# Patient Record
Sex: MALE | Race: WHITE | NOT HISPANIC OR LATINO | Employment: PART TIME | URBAN - METROPOLITAN AREA
[De-identification: names, ages, dates, MRNs, and addresses within clinical notes are randomized per-mention and may not be internally consistent; named-entity substitution may affect disease eponyms.]

---
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
## 2020-02-03 RX ORDER — LEVETIRACETAM 500 MG/1
TABLET ORAL
Qty: 60 TABLET | Refills: 1 | Status: SHIPPED | OUTPATIENT
Start: 2020-02-03 | End: 2023-08-24

## 2022-04-11 ENCOUNTER — HISTORICAL (OUTPATIENT)
Dept: ADMINISTRATIVE | Facility: HOSPITAL | Age: 22
End: 2022-04-11

## 2022-04-29 VITALS
HEIGHT: 67 IN | WEIGHT: 171.5 LBS | SYSTOLIC BLOOD PRESSURE: 120 MMHG | BODY MASS INDEX: 26.92 KG/M2 | DIASTOLIC BLOOD PRESSURE: 78 MMHG

## 2022-08-15 ENCOUNTER — HOSPITAL ENCOUNTER (EMERGENCY)
Facility: HOSPITAL | Age: 22
Discharge: PSYCHIATRIC OR SUBSTANCE USE FACILITY | End: 2022-08-16
Attending: EMERGENCY MEDICINE
Payer: COMMERCIAL

## 2022-08-15 DIAGNOSIS — F95.2 TOURETTE DISEASE: ICD-10-CM

## 2022-08-15 DIAGNOSIS — R25.9 ABNORMAL INVOLUNTARY MOVEMENTS: ICD-10-CM

## 2022-08-15 DIAGNOSIS — F42.9 OBSESSIVE-COMPULSIVE DISORDER, UNSPECIFIED TYPE: Primary | ICD-10-CM

## 2022-08-15 DIAGNOSIS — R45.1 RESTLESSNESS AND AGITATION: ICD-10-CM

## 2022-08-15 PROCEDURE — 99285 EMERGENCY DEPT VISIT HI MDM: CPT | Mod: 25

## 2022-08-16 VITALS
OXYGEN SATURATION: 99 % | RESPIRATION RATE: 20 BRPM | BODY MASS INDEX: 34.21 KG/M2 | WEIGHT: 218 LBS | DIASTOLIC BLOOD PRESSURE: 84 MMHG | SYSTOLIC BLOOD PRESSURE: 126 MMHG | HEIGHT: 67 IN | HEART RATE: 71 BPM | TEMPERATURE: 98 F

## 2022-08-16 LAB
ALBUMIN SERPL-MCNC: 4.7 GM/DL (ref 3.5–5)
ALBUMIN/GLOB SERPL: 1.7 RATIO (ref 1.1–2)
ALP SERPL-CCNC: 81 UNIT/L (ref 40–150)
ALT SERPL-CCNC: 27 UNIT/L (ref 0–55)
AMPHET UR QL SCN: NEGATIVE
APAP SERPL-MCNC: <17.4 UG/ML (ref 17.4–30)
APPEARANCE UR: CLEAR
AST SERPL-CCNC: 20 UNIT/L (ref 5–34)
BACTERIA #/AREA URNS AUTO: NORMAL /HPF
BARBITURATE SCN PRESENT UR: NEGATIVE
BASOPHILS # BLD AUTO: 0.07 X10(3)/MCL (ref 0–0.2)
BASOPHILS NFR BLD AUTO: 0.6 %
BENZODIAZ UR QL SCN: NEGATIVE
BILIRUB UR QL STRIP.AUTO: NEGATIVE MG/DL
BILIRUBIN DIRECT+TOT PNL SERPL-MCNC: 0.6 MG/DL
BUN SERPL-MCNC: 6.4 MG/DL (ref 8.9–20.6)
CALCIUM SERPL-MCNC: 10.4 MG/DL (ref 8.4–10.2)
CANNABINOIDS UR QL SCN: NEGATIVE
CHLORIDE SERPL-SCNC: 103 MMOL/L (ref 98–107)
CO2 SERPL-SCNC: 27 MMOL/L (ref 22–29)
COCAINE UR QL SCN: NEGATIVE
COLOR UR AUTO: YELLOW
CREAT SERPL-MCNC: 1.17 MG/DL (ref 0.73–1.18)
EOSINOPHIL # BLD AUTO: 0.18 X10(3)/MCL (ref 0–0.9)
EOSINOPHIL NFR BLD AUTO: 1.5 %
ERYTHROCYTE [DISTWIDTH] IN BLOOD BY AUTOMATED COUNT: 12 % (ref 11.5–17)
ETHANOL SERPL-MCNC: <10 MG/DL
FENTANYL UR QL SCN: NEGATIVE
GFR SERPLBLD CREATININE-BSD FMLA CKD-EPI: >60 MLS/MIN/1.73/M2
GLOBULIN SER-MCNC: 2.8 GM/DL (ref 2.4–3.5)
GLUCOSE SERPL-MCNC: 121 MG/DL (ref 74–100)
GLUCOSE UR QL STRIP.AUTO: NEGATIVE MG/DL
HCT VFR BLD AUTO: 47.8 % (ref 42–52)
HGB BLD-MCNC: 16.5 GM/DL (ref 14–18)
IMM GRANULOCYTES # BLD AUTO: 0.07 X10(3)/MCL (ref 0–0.04)
IMM GRANULOCYTES NFR BLD AUTO: 0.6 %
KETONES UR QL STRIP.AUTO: NEGATIVE MG/DL
LEUKOCYTE ESTERASE UR QL STRIP.AUTO: NEGATIVE UNIT/L
LYMPHOCYTES # BLD AUTO: 2.07 X10(3)/MCL (ref 0.6–4.6)
LYMPHOCYTES NFR BLD AUTO: 17.1 %
MCH RBC QN AUTO: 30 PG (ref 27–31)
MCHC RBC AUTO-ENTMCNC: 34.5 MG/DL (ref 33–36)
MCV RBC AUTO: 86.9 FL (ref 80–94)
MDMA UR QL SCN: NEGATIVE
MONOCYTES # BLD AUTO: 0.6 X10(3)/MCL (ref 0.1–1.3)
MONOCYTES NFR BLD AUTO: 4.9 %
NEUTROPHILS # BLD AUTO: 9.1 X10(3)/MCL (ref 2.1–9.2)
NEUTROPHILS NFR BLD AUTO: 75.3 %
NITRITE UR QL STRIP.AUTO: NEGATIVE
NRBC BLD AUTO-RTO: 0 %
OPIATES UR QL SCN: NEGATIVE
PCP UR QL: NEGATIVE
PH UR STRIP.AUTO: 6.5 [PH]
PH UR: 6.5 [PH] (ref 3–11)
PLATELET # BLD AUTO: 329 X10(3)/MCL (ref 130–400)
PMV BLD AUTO: 10.1 FL (ref 7.4–10.4)
POTASSIUM SERPL-SCNC: 4 MMOL/L (ref 3.5–5.1)
PROT SERPL-MCNC: 7.5 GM/DL (ref 6.4–8.3)
PROT UR QL STRIP.AUTO: NEGATIVE MG/DL
RBC # BLD AUTO: 5.5 X10(6)/MCL (ref 4.7–6.1)
RBC #/AREA URNS AUTO: <5 /HPF
RBC UR QL AUTO: NEGATIVE UNIT/L
SARS-COV-2 RDRP RESP QL NAA+PROBE: NEGATIVE
SODIUM SERPL-SCNC: 139 MMOL/L (ref 136–145)
SP GR UR STRIP.AUTO: 1 (ref 1–1.03)
SPECIFIC GRAVITY, URINE AUTO (.000) (OHS): 1 (ref 1–1.03)
SQUAMOUS #/AREA URNS AUTO: <5 /HPF
TSH SERPL-ACNC: 3.03 UIU/ML (ref 0.35–4.94)
UROBILINOGEN UR STRIP-ACNC: 0.2 MG/DL
WBC # SPEC AUTO: 12.1 X10(3)/MCL (ref 4.5–11.5)
WBC #/AREA URNS AUTO: <5 /HPF

## 2022-08-16 PROCEDURE — 96361 HYDRATE IV INFUSION ADD-ON: CPT

## 2022-08-16 PROCEDURE — 25000003 PHARM REV CODE 250: Performed by: EMERGENCY MEDICINE

## 2022-08-16 PROCEDURE — 84443 ASSAY THYROID STIM HORMONE: CPT | Performed by: EMERGENCY MEDICINE

## 2022-08-16 PROCEDURE — 85025 COMPLETE CBC W/AUTO DIFF WBC: CPT | Performed by: EMERGENCY MEDICINE

## 2022-08-16 PROCEDURE — 80053 COMPREHEN METABOLIC PANEL: CPT | Performed by: EMERGENCY MEDICINE

## 2022-08-16 PROCEDURE — 80143 DRUG ASSAY ACETAMINOPHEN: CPT | Performed by: EMERGENCY MEDICINE

## 2022-08-16 PROCEDURE — 82077 ASSAY SPEC XCP UR&BREATH IA: CPT | Performed by: EMERGENCY MEDICINE

## 2022-08-16 PROCEDURE — 63600175 PHARM REV CODE 636 W HCPCS: Performed by: EMERGENCY MEDICINE

## 2022-08-16 PROCEDURE — 80307 DRUG TEST PRSMV CHEM ANLYZR: CPT | Performed by: EMERGENCY MEDICINE

## 2022-08-16 PROCEDURE — 96372 THER/PROPH/DIAG INJ SC/IM: CPT | Performed by: EMERGENCY MEDICINE

## 2022-08-16 PROCEDURE — 96374 THER/PROPH/DIAG INJ IV PUSH: CPT

## 2022-08-16 PROCEDURE — 81001 URINALYSIS AUTO W/SCOPE: CPT | Performed by: EMERGENCY MEDICINE

## 2022-08-16 PROCEDURE — 87635 SARS-COV-2 COVID-19 AMP PRB: CPT | Performed by: EMERGENCY MEDICINE

## 2022-08-16 PROCEDURE — 36415 COLL VENOUS BLD VENIPUNCTURE: CPT | Performed by: EMERGENCY MEDICINE

## 2022-08-16 RX ORDER — FLUOXETINE HYDROCHLORIDE 40 MG/1
40 CAPSULE ORAL DAILY
COMMUNITY
End: 2023-08-24

## 2022-08-16 RX ORDER — ARIPIPRAZOLE 2 MG/1
5 TABLET ORAL DAILY
COMMUNITY
End: 2023-08-24

## 2022-08-16 RX ORDER — DIPHENHYDRAMINE HYDROCHLORIDE 50 MG/ML
25 INJECTION INTRAMUSCULAR; INTRAVENOUS
Status: COMPLETED | OUTPATIENT
Start: 2022-08-16 | End: 2022-08-16

## 2022-08-16 RX ORDER — TETRABENAZINE 12.5 MG/1
12.5 TABLET, COATED ORAL 2 TIMES DAILY
COMMUNITY
End: 2023-08-24

## 2022-08-16 RX ORDER — TRAZODONE HYDROCHLORIDE 50 MG/1
25 TABLET ORAL NIGHTLY
COMMUNITY

## 2022-08-16 RX ORDER — LORAZEPAM 1 MG/1
2 TABLET ORAL
Status: COMPLETED | OUTPATIENT
Start: 2022-08-16 | End: 2022-08-16

## 2022-08-16 RX ORDER — LEVETIRACETAM 750 MG/1
500 TABLET ORAL 2 TIMES DAILY
COMMUNITY
End: 2023-08-24 | Stop reason: SDUPTHER

## 2022-08-16 RX ORDER — GUANFACINE 2 MG/1
2 TABLET ORAL NIGHTLY
COMMUNITY
End: 2023-08-24

## 2022-08-16 RX ORDER — BENZTROPINE MESYLATE 1 MG/ML
2 INJECTION, SOLUTION INTRAMUSCULAR; INTRAVENOUS
Status: COMPLETED | OUTPATIENT
Start: 2022-08-16 | End: 2022-08-16

## 2022-08-16 RX ADMIN — BENZTROPINE MESYLATE 2 MG: 1 INJECTION INTRAMUSCULAR; INTRAVENOUS at 12:08

## 2022-08-16 RX ADMIN — SODIUM CHLORIDE 1000 ML: 9 INJECTION, SOLUTION INTRAVENOUS at 01:08

## 2022-08-16 RX ADMIN — LORAZEPAM 2 MG: 1 TABLET ORAL at 12:08

## 2022-08-16 RX ADMIN — DIPHENHYDRAMINE HYDROCHLORIDE 25 MG: 50 INJECTION INTRAMUSCULAR; INTRAVENOUS at 12:08

## 2022-08-16 NOTE — PROGRESS NOTES
Patient evaluated at bedside. Resting comfortably. No acute medical complaints at this time. Afebrile, HDS.    GEN: Awake, alert  HEENT: NC/AT  Neck: Supple, no stridor  CV: RRR, no M/R/G  RESP: Lungs CTAB, no wheezes  Abd: Soft, NT/ND  Neuro: GCS15, no focal neuro deficits  Skin: no bruising or abrasions    A/P:  22M presents for labile mood and restlessness currently held under PEC for inpatient psychiatric treatment.   - Continue PEC  - Inpatient psychiatric treatment  - Resume home meds

## 2022-08-16 NOTE — ED PROVIDER NOTES
Encounter Date: 8/15/2022       History     Chief Complaint   Patient presents with    Panic Attack     Recently switched to prozac. Hx ocd, seizures. Mother states he wont sit down; however, unsteady gait. States his whole body is twitching. Flat affect; however, mother states he has been manic walking around back yard dt severe ocd. Mtr states it's dangerous for him to be at home. Mtr states noncompliant with meds a month prior. Ocd worsening. Pt counting breaths and steps. Mtr gave benadryl pta      22-year-old male with a history of OCD, Tourette's syndrome, epilepsy on multiple medications including recently initiated on Prozac, also on trazodone, Abilify, Keppra, tetrabenazine presents to the emergency department accompanied by his mother who reports that his symptoms have been progressively worsening and now are severe.  She reports that he is manic but also reports that he has been intermittently lethargic, slow to respond.  He has baseline gait instability since childhood but this has worsened recently.  He additionally has started to have more twitching.  She is concerned for his safety as he has been very restless recently, not sleeping, walking up and down the stairs and outside by the pool, out the front door, etc..  No suicidal homicidal ideations expressed; however, states they have been in communication with his psychiatric provider who states that he needs urgent evaluation and stabilization.    They had been trying to get him into a long-term inpatient facility in [place] but have not yet been able to secure this placement.    The history is provided by the patient, a relative and a caregiver.   Mental Health Problem  The primary symptoms include agitation and bizarre behavior. The primary symptoms do not include self-injury, somatic symptoms or suicidal threats. The current episode started several weeks ago. This is a recurrent problem.   The degree of incapacity that he is experiencing as a  consequence of his illness is severe. Sequelae of the illness include an inability to care for self. Additional symptoms of the illness include agitation and decreased need for sleep. He does not admit to suicidal ideas. He does not contemplate harming himself. He does not contemplate injuring another person. Risk factors that are present for mental illness include a history of mental illness and epilepsy.     Review of patient's allergies indicates:   Allergen Reactions    Amoxicillin     Azithromycin     Sulfa (sulfonamide antibiotics) Hives and Itching    Penicillin Nausea And Vomiting     No past medical history on file.  No past surgical history on file.  No family history on file.     Review of Systems   Unable to perform ROS: Psychiatric disorder (patient perseverating on asking for mediations for his twitching, unable to answer questions)   Psychiatric/Behavioral: Positive for agitation. Negative for self-injury.       Physical Exam     Initial Vitals [08/15/22 2330]   BP Pulse Resp Temp SpO2   135/89 91 16 98.2 °F (36.8 °C) 98 %      MAP       --         Physical Exam    Nursing note and vitals reviewed.  Constitutional: He appears well-developed and well-nourished. No distress.   Restless, fidgeting, getting up and down and walking around in room, slightly unsteady gait   HENT:   Head: Normocephalic and atraumatic.   Mouth/Throat: Oropharynx is clear and moist.   Eyes: Conjunctivae are normal. Pupils are equal, round, and reactive to light.   Neck: Neck supple.   Normal range of motion.  Cardiovascular: Normal rate, regular rhythm and normal heart sounds.   Pulmonary/Chest: Breath sounds normal. No respiratory distress.   Abdominal: Abdomen is soft. He exhibits no distension. There is no abdominal tenderness.   Musculoskeletal:         General: No tenderness. Normal range of motion.      Cervical back: Normal range of motion and neck supple.     Neurological: He is alert and oriented to person, place,  and time. He has normal strength. No cranial nerve deficit or sensory deficit.   Intermittent twitching, distractible, primarily noted in L fingers and R lower leg  Able to ambulate but intermittently steps backward  Normal FNF, negative Rhomberg   Skin: Skin is warm and dry.   Psychiatric: His mood appears anxious. His speech is delayed. He expresses no suicidal ideation. He expresses no suicidal plans.         ED Course   Procedures  Labs Reviewed   COMPREHENSIVE METABOLIC PANEL - Abnormal; Notable for the following components:       Result Value    Glucose Level 121 (*)     Blood Urea Nitrogen 6.4 (*)     Calcium Level Total 10.4 (*)     All other components within normal limits   ACETAMINOPHEN LEVEL - Abnormal; Notable for the following components:    Acetaminophen Level <17.4 (*)     All other components within normal limits   CBC WITH DIFFERENTIAL - Abnormal; Notable for the following components:    WBC 12.1 (*)     IG# 0.07 (*)     All other components within normal limits   TSH - Normal   URINALYSIS, REFLEX TO URINE CULTURE - Normal   DRUG SCREEN, URINE (BEAKER) - Normal    Narrative:     Cut off concentrations:    Amphetamines - 1000 ng/ml  Barbiturates - 200 ng/ml  Benzodiazepine - 200 ng/ml  Cannabinoids (THC) - 50 ng/ml  Cocaine - 300 ng/ml  Fentanyl - 1.0 ng/ml  MDMA - 500 ng/ml  Opiates - 300 ng/ml   Phencyclidine (PCP) - 25 ng/ml    Specimen submitted for drug analysis and tested for pH and specific gravity in order to evaluate sample integrity. Suspect tampering if specific gravity is <1.003 and/or pH is not within the range of 4.5 - 8.0  False negatives may result form substances such as bleach added to urine.  False positives may result for the presence of a substance with similar chemical structure to the drug or its metabolite.    This test provides only a PRELIMINARY analytical test result. A more specific alternate chemical method must be used in order to obtain a confirmed analytical result.  Gas chromatography/mass spectrometry (GC/MS) is the preferred confirmatory method. Other chemical confirmation methods are available. Clinical consideration and professional judgement should be applied to any drug of abuse test result, particularly when preliminary positive results are used.    Positive results will be confirmed only at the physicians request. Unconfirmed screening results are to be used only for medical purposes (treatment).        ALCOHOL,MEDICAL (ETHANOL) - Normal   SARS-COV-2 RNA AMPLIFICATION, QUAL - Normal   URINALYSIS, MICROSCOPIC - Normal   CBC W/ AUTO DIFFERENTIAL    Narrative:     The following orders were created for panel order CBC auto differential.  Procedure                               Abnormality         Status                     ---------                               -----------         ------                     CBC with Differential[[id number]]        Abnormal            Final result                 Please view results for these tests on the individual orders.          Imaging Results          CT Head Without Contrast (Preliminary result)  Result time 08/16/22 01:33:54    Preliminary result by Interface, Rad Results In (08/16/22 01:32:25)                 Narrative:    START OF REPORT:  Technique: CT of the head was performed without intravenous contrast with axial as well as coronal and sagittal images.    Comparison: None.    Dosage Information: Automated exposure control was utilized.    Clinical history: Panic attack, unsteady gait, hx of ocd.    Findings:  Hemorrhage: No acute intracranial hemorrhage is seen.  CSF spaces: The ventricles sulci and basal cisterns are within normal limits.  Brain parenchyma: Unremarkable with preservation of the grey white junction throughout. No acute infarct.  Cerebellum: Unremarkable.  Vascular: Unremarkable venous sinuses.  Sella and skull base: The sella appears to be within normal limits for age.  Cerebellopontine angles: Within normal  limits.  Herniation: None.  Intracranial calcifications: Incidental note is made of bilateral choroid plexus calcification. Incidental note is made of some calcification of the falx.  Calvarium: No acute linear or depressed skull fracture is seen.    Maxillofacial Structures:  Paranasal sinuses: The visualized paranasal sinuses appear clear with no mucoperiosteal thickening or air fluid levels identified.  Orbits: The orbits appear unremarkable.  Zygomatic arches: The zygomatic arches are intact and unremarkable.  Temporal bones and mastoids: The temporal bones and mastoids appear unremarkable.  TMJ: The mandibular condyles appear normally placed with respect to the mandibular fossa.  Nasal Bones: The nasal septum is midline.    Visualized upper cervical spine: The visualized cervical spine appears unremarkable.      Impression:  1. No acute intracranial process identified.                      Preliminary result by Giovanny Batista Jr., MD (08/16/22 01:32:25)                 Narrative:    START OF REPORT:  Technique: CT of the head was performed without intravenous contrast with axial as well as coronal and sagittal images.    Comparison: None.    Dosage Information: Automated exposure control was utilized.    Clinical history: Panic attack, unsteady gait, hx of ocd.    Findings:  Hemorrhage: No acute intracranial hemorrhage is seen.  CSF spaces: The ventricles sulci and basal cisterns are within normal limits.  Brain parenchyma: Unremarkable with preservation of the grey white junction throughout. No acute infarct.  Cerebellum: Unremarkable.  Vascular: Unremarkable venous sinuses.  Sella and skull base: The sella appears to be within normal limits for age.  Cerebellopontine angles: Within normal limits.  Herniation: None.  Intracranial calcifications: Incidental note is made of bilateral choroid plexus calcification. Incidental note is made of some calcification of the falx.  Calvarium: No acute linear or  depressed skull fracture is seen.    Maxillofacial Structures:  Paranasal sinuses: The visualized paranasal sinuses appear clear with no mucoperiosteal thickening or air fluid levels identified.  Orbits: The orbits appear unremarkable.  Zygomatic arches: The zygomatic arches are intact and unremarkable.  Temporal bones and mastoids: The temporal bones and mastoids appear unremarkable.  TMJ: The mandibular condyles appear normally placed with respect to the mandibular fossa.  Nasal Bones: The nasal septum is midline.    Visualized upper cervical spine: The visualized cervical spine appears unremarkable.      Impression:  1. No acute intracranial process identified.                                   Medications   benztropine mesylate injection 2 mg (2 mg Intramuscular Given 8/16/22 0053)   diphenhydrAMINE injection 25 mg (25 mg Intravenous Given 8/16/22 0052)   LORazepam tablet 2 mg (2 mg Oral Given 8/16/22 0052)   sodium chloride 0.9% bolus 1,000 mL (0 mLs Intravenous Stopped 8/16/22 0245)     Medical Decision Making:   Initial Assessment:   Physician's Emergency certificate filed for grave disability as the patient's mother reports that despite compliance with his medications, symptoms are significantly and rapidly worsening and he is now unable to safely be at home.  Symptoms may be related to his underlying psychiatric condition or could be reflective of least a partial dystonic reaction to some of the medications.  Will try benztropine and Benadryl to try to help with his symptoms but I agree at this point it does not seem like he is doing well with outpatient management and requires more intensive, inpatient evaluation and consideration of medication adjustments.  He has no focal neurologic deficits for me and her extensive discussion with the mother, symptoms seem to be more severe than his baseline but not new in character to his baseline neurologic changes.  Will obtain labs and CT scan to further evaluate at  this time.  Differential Diagnosis:   OCD, Tourette's syndrome, movement disorder, epilepsy, medication effect, dystonic reaction  Clinical Tests:   Lab Tests: Ordered and Reviewed  The following lab test(s) were unremarkable: CBC and CMP       <> Summary of Lab: Labs unrevealing  Radiological Study: Ordered and Reviewed  ED Management:  ED workup unrevealing.  As discussed above, mother states not safe to be at home, neuropsychiatric symptoms worsening, recommended for inpatient admission by his psychiatrist.  At this point, he appears medically stable for transfer for inpatient psychiatric evaluation and treatment.                 Medically cleared for psychiatry placement: 8/16/2022  4:21 AM    Clinical Impression:   Final diagnoses:  [F42.9] Obsessive-compulsive disorder, unspecified type (Primary)  [R25.9] Abnormal involuntary movements  [F95.2] Tourette disease  [R45.1] Restlessness and agitation          ED Disposition Condition    Transfer to Psych Facility         ED Prescriptions     None        Follow-up Information    None          Fawn Zamora MD  08/16/22 0421

## 2023-05-17 DIAGNOSIS — G40.909 NONINTRACTABLE EPILEPSY WITHOUT STATUS EPILEPTICUS, UNSPECIFIED EPILEPSY TYPE: Primary | ICD-10-CM

## 2023-05-17 DIAGNOSIS — F95.2 TOURETTES DISORDER: ICD-10-CM

## 2023-08-24 ENCOUNTER — OFFICE VISIT (OUTPATIENT)
Dept: NEUROLOGY | Facility: CLINIC | Age: 23
End: 2023-08-24
Payer: COMMERCIAL

## 2023-08-24 VITALS
BODY MASS INDEX: 36.88 KG/M2 | WEIGHT: 235 LBS | HEIGHT: 67 IN | DIASTOLIC BLOOD PRESSURE: 82 MMHG | SYSTOLIC BLOOD PRESSURE: 124 MMHG

## 2023-08-24 DIAGNOSIS — G40.909 NONINTRACTABLE EPILEPSY WITHOUT STATUS EPILEPTICUS, UNSPECIFIED EPILEPSY TYPE: ICD-10-CM

## 2023-08-24 DIAGNOSIS — F95.2 TOURETTES DISORDER: ICD-10-CM

## 2023-08-24 PROCEDURE — 3074F PR MOST RECENT SYSTOLIC BLOOD PRESSURE < 130 MM HG: ICD-10-PCS | Mod: CPTII,S$GLB,, | Performed by: SPECIALIST

## 2023-08-24 PROCEDURE — 3079F DIAST BP 80-89 MM HG: CPT | Mod: CPTII,S$GLB,, | Performed by: SPECIALIST

## 2023-08-24 PROCEDURE — 3008F PR BODY MASS INDEX (BMI) DOCUMENTED: ICD-10-PCS | Mod: CPTII,S$GLB,, | Performed by: SPECIALIST

## 2023-08-24 PROCEDURE — 99999 PR PBB SHADOW E&M-EST. PATIENT-LVL III: CPT | Mod: PBBFAC,,, | Performed by: SPECIALIST

## 2023-08-24 PROCEDURE — 3008F BODY MASS INDEX DOCD: CPT | Mod: CPTII,S$GLB,, | Performed by: SPECIALIST

## 2023-08-24 PROCEDURE — 3074F SYST BP LT 130 MM HG: CPT | Mod: CPTII,S$GLB,, | Performed by: SPECIALIST

## 2023-08-24 PROCEDURE — 99999 PR PBB SHADOW E&M-EST. PATIENT-LVL III: ICD-10-PCS | Mod: PBBFAC,,, | Performed by: SPECIALIST

## 2023-08-24 PROCEDURE — 99203 OFFICE O/P NEW LOW 30 MIN: CPT | Mod: S$GLB,,, | Performed by: SPECIALIST

## 2023-08-24 PROCEDURE — 1159F MED LIST DOCD IN RCRD: CPT | Mod: CPTII,S$GLB,, | Performed by: SPECIALIST

## 2023-08-24 PROCEDURE — 1159F PR MEDICATION LIST DOCUMENTED IN MEDICAL RECORD: ICD-10-PCS | Mod: CPTII,S$GLB,, | Performed by: SPECIALIST

## 2023-08-24 PROCEDURE — 99203 PR OFFICE/OUTPT VISIT, NEW, LEVL III, 30-44 MIN: ICD-10-PCS | Mod: S$GLB,,, | Performed by: SPECIALIST

## 2023-08-24 PROCEDURE — 3079F PR MOST RECENT DIASTOLIC BLOOD PRESSURE 80-89 MM HG: ICD-10-PCS | Mod: CPTII,S$GLB,, | Performed by: SPECIALIST

## 2023-08-24 RX ORDER — FLUVOXAMINE MALEATE 100 MG/1
100 TABLET, COATED ORAL NIGHTLY
COMMUNITY
Start: 2023-08-23

## 2023-08-24 RX ORDER — LEVETIRACETAM 750 MG/1
750 TABLET ORAL 2 TIMES DAILY
Qty: 180 TABLET | Refills: 3 | Status: SHIPPED | OUTPATIENT
Start: 2023-08-24

## 2023-08-24 RX ORDER — CLINDAMYCIN HYDROCHLORIDE 300 MG/1
300 CAPSULE ORAL 3 TIMES DAILY
COMMUNITY
Start: 2023-08-16

## 2023-08-24 RX ORDER — PYRIDOXINE HCL (VITAMIN B6) 100 MG
100 TABLET ORAL
COMMUNITY
Start: 2022-09-06

## 2023-08-24 NOTE — PROGRESS NOTES
"Subjective:      @Patient ID: Mango Mitchell is a 23 y.o. male.    Chief Complaint: re estab seizures; Tourette's     HPI:            New pt for sz eval (Here for for sz eval//Pt reports dx of sz 7 yrs ago; last known sz when he was diagnosed. Taking Keppra 750 mg BID, tolerating well.)      Notes may also be on facesheet for HPI, ROS, and other sections     Review of Systems         Social History     Tobacco Use    Smoking status: Never    Smokeless tobacco: Never   Substance Use Topics    Alcohol use: Never    Drug use: Never      [] Single     []     [] []   [] Working     [] Retired, worked as:   [] Drives     [] Does not drive     ----------------------------  Seizures    Current Outpatient Medications   Medication Instructions    clindamycin (CLEOCIN) 300 mg, Oral, 3 times daily    fluvoxaMINE (LUVOX) 100 mg, Oral, Nightly    guanFACINE (INTUNIV ER) 2 mg Tb24 TAKE ONE TABLET BY MOUTH EVERY MORNING and 1 at bedtime    levETIRAcetam (KEPPRA) 750 mg, Oral, 2 times daily    pyridoxine (vitamin B6) (B-6) 100 mg, Oral    traZODone (DESYREL) 25 mg, Oral, Nightly        Objective:        Exam:   Visit Vitals  /82 (BP Location: Left arm, Patient Position: Sitting)   Ht 5' 7" (1.702 m)   Wt 106.6 kg (235 lb)   BMI 36.81 kg/m²       General Exam  [x] Unaccompanied   [] Accompanied, by__ []Spouse     []Child            []_____________            body habitus_ Body mass index is 36.81 kg/m².    []Gen exam overall unremarkable    []Abnormalities, if present, checked     []Mental Status_alert and appropriate    []Oropharynx_Mallampati grade_1 or 2  [] OP   M3    [] M4  []Neck_ no bruits     [] Bruit   [x]Heart__ RRR s murmurs or extra beats   [] Irreg  []murmur  []Extremities_ no edema or lesions   [] Extr edema     Neurological:  []Normal neuro exam          []Cortical function seems normal  Abnormalities, if present, checked     []Speech __ normal    []    Cranial nerves:    []  []CN " 2 VF_ok     []  []Fundi_ normal     []  []CN 3, 4, 6 EOMs_ok   []  []CN 3, pupils_ok   []  []CN 7_no lower face asymmetry  []  []CN 8_hearing _ ok   []  []CN 12 tongue_ok   []    []Motor__ normal all groups   []  []Tone: normal     []  []Reflexes__ normal or unremarkable  []  []Vib Sens_ normal in extr's incl toes  []  []Pin Sens_    []  []Plantars__ flat     []  []Tremor: _ none    []  []Coordination: _ F to N normal  []  []Gait_      []Cane  []Wheelchair  []_______  []Romberg: negative    []Romberg positive     []MMSE; if done:         No data to display                 Neuroimaging:  [] Images and imaging reports reviewed.  Rads summary:  My comments:     Labs:            Assessment/Plan:         ICD-10-CM ICD-9-CM   1. Nonintractable epilepsy without status epilepticus, unspecified epilepsy type  G40.909 345.90   2. Tourettes disorder  F95.2 307.23             Other Comments / Follow Up:        Medications Ordered This Encounter   Medications    levETIRAcetam (KEPPRA) 750 MG Tab     Sig: Take 1 tablet (750 mg total) by mouth 2 (two) times daily.     Dispense:  180 tablet     Refill:  3        No follow-ups on file.    Jono Ramos MD GUMARO FAAN, [facility]  [facility] Medical Director   [facility]

## 2024-08-23 DIAGNOSIS — G40.909 NONINTRACTABLE EPILEPSY WITHOUT STATUS EPILEPTICUS, UNSPECIFIED EPILEPSY TYPE: Primary | ICD-10-CM

## 2024-08-23 DIAGNOSIS — F95.2 TOURETTES DISORDER: ICD-10-CM

## 2024-08-23 RX ORDER — LEVETIRACETAM 750 MG/1
750 TABLET ORAL 2 TIMES DAILY
Qty: 180 TABLET | Refills: 0 | Status: SHIPPED | OUTPATIENT
Start: 2024-08-23

## 2024-08-23 NOTE — TELEPHONE ENCOUNTER
----- Message from Estephanie Jain LPN sent at 2024  4:41 PM CDT -----  Regarding: KEON CLINICAL MESSAGE  u 22-Aug-24 12:49p TAKEN     To:          Ofc  From:        Leanne Mitchell  Phone:      [phone]  Patient:     Same  :        2000  RegDr:      Dr Jono Ramos  Ref:         Would like a call back.      Subject:         Patient Calls  ClrID:   [phone]

## 2024-12-11 DIAGNOSIS — G40.909 NONINTRACTABLE EPILEPSY WITHOUT STATUS EPILEPTICUS, UNSPECIFIED EPILEPSY TYPE: ICD-10-CM

## 2024-12-11 DIAGNOSIS — F95.2 TOURETTES DISORDER: ICD-10-CM

## 2024-12-11 RX ORDER — LEVETIRACETAM 750 MG/1
750 TABLET ORAL 2 TIMES DAILY
Qty: 180 TABLET | Refills: 0 | Status: SHIPPED | OUTPATIENT
Start: 2024-12-11

## 2025-03-17 DIAGNOSIS — F95.2 TOURETTES DISORDER: ICD-10-CM

## 2025-03-17 DIAGNOSIS — G40.909 NONINTRACTABLE EPILEPSY WITHOUT STATUS EPILEPTICUS, UNSPECIFIED EPILEPSY TYPE: Primary | ICD-10-CM

## 2025-03-17 RX ORDER — LEVETIRACETAM 750 MG/1
750 TABLET ORAL 2 TIMES DAILY
Qty: 180 TABLET | Refills: 0 | Status: SHIPPED | OUTPATIENT
Start: 2025-03-17 | End: 2025-03-21 | Stop reason: SDUPTHER

## 2025-03-21 ENCOUNTER — OFFICE VISIT (OUTPATIENT)
Facility: CLINIC | Age: 25
End: 2025-03-21
Payer: COMMERCIAL

## 2025-03-21 VITALS
BODY MASS INDEX: 36.88 KG/M2 | WEIGHT: 235 LBS | DIASTOLIC BLOOD PRESSURE: 83 MMHG | HEIGHT: 67 IN | SYSTOLIC BLOOD PRESSURE: 124 MMHG

## 2025-03-21 DIAGNOSIS — G40.909 NONINTRACTABLE EPILEPSY WITHOUT STATUS EPILEPTICUS, UNSPECIFIED EPILEPSY TYPE: ICD-10-CM

## 2025-03-21 DIAGNOSIS — F95.2 TOURETTES DISORDER: ICD-10-CM

## 2025-03-21 PROCEDURE — 99999 PR PBB SHADOW E&M-EST. PATIENT-LVL III: CPT | Mod: PBBFAC,,,

## 2025-03-21 RX ORDER — LEVETIRACETAM 750 MG/1
750 TABLET ORAL 2 TIMES DAILY
Qty: 180 TABLET | Refills: 3 | Status: SHIPPED | OUTPATIENT
Start: 2025-03-21

## 2025-03-21 NOTE — PROGRESS NOTES
"  Neurology     SUBJECTIVE:  Patient ID: Mango Mitchell is a 25 y.o. male.  MRN: [id number]    Chief Complaint: seizure follow-up     History of Present Illness:   Pt presents for seizure follow-up.     Last sz was 2018, still drives. Taking medication as prescribed--keppra 750mg BID. Denies skipping doses. Tolerating medication well without side effects. Sleeps well at night, 6-8 hrs per night. Rarely takes trazodone for sleep, maybe once/month. Mood okay.        Review of Systems - as per HPI, otherwise a balanced 10 systems review is negative.        Past Medical History:   Diagnosis Date    Seizures        Past Surgical History:   Procedure Laterality Date    TONSILLECTOMY         Family History   Family history unknown: Yes       Social History     Socioeconomic History    Marital status: Single   Tobacco Use    Smoking status: Never    Smokeless tobacco: Never   Substance and Sexual Activity    Alcohol use: Never    Drug use: Never       Review of patient's allergies indicates:   Allergen Reactions    Amoxicillin     Azithromycin     Sulfa (sulfonamide antibiotics) Hives and Itching    Penicillin Nausea And Vomiting       Current Outpatient Medications   Medication Instructions    clindamycin (CLEOCIN) 300 mg, 3 times daily    fluvoxaMINE (LUVOX) 100 mg, Nightly    guanFACINE (INTUNIV ER) 2 mg Tb24 TAKE ONE TABLET BY MOUTH EVERY MORNING and 1 at bedtime    levETIRAcetam (KEPPRA) 750 mg, Oral, 2 times daily    pyridoxine (vitamin B6) (B-6) 100 mg    traZODone (DESYREL) 25 mg, Nightly       OBJECTIVE:  Vitals:  Visit Vitals  /83 (BP Location: Left forearm, Patient Position: Sitting)   Ht 5' 7" (1.702 m)   Wt 106.6 kg (235 lb)   BMI 36.81 kg/m²       Physical Exam   Constitutional: he appears well-developed and well-nourished. he is well groomed. NAD  Head: Normocephalic and atraumatic  Eyes: Conjunctivae and EOM are normal.   Cardiovascular: RRR, no murmur  Lungs: pulmonary effort is normal. Clear " breath sounds  Musculoskeletal: Range of motion grossly normal  Skin: Skin is warm and dry, no obvious lesions  Psychiatric: Normal mood and affect.     Neuro exam:    Mental status:  The patient is alert and oriented to person, place and time.  Language is intact and fluent  Normal attention and concentration  Cranial Nerves:  Pupils are equal, round, and reactive to light   Extraocular movements are intact and without nystagmus.    Visual fields are full to confrontation testing.   Facial movement is symmetric.  Hearing is intact   Tongue protrudes midline  Coordination:     Finger to nose - normal and symmetric bilaterally  Motor:  Normal muscle bulk and symmetry  5/5 in all extremities  No lateralizing deficits  Gait:   Normal gait  Sensation:    Vib intact throughout   Reflexes:     2+ throughout      Review of Data:   Notes from last OV reviewed     ASSESSMENT:    ICD-10-CM ICD-9-CM   1. Nonintractable epilepsy without status epilepticus, unspecified epilepsy type  G40.909 345.90   2. Tourettes disorder  F95.2 307.23        PLAN:    Pt remains seizure free--continue present management. Refills for keppra generated    Importance of healthy diet, regular exercise and sleep hygiene discussed  Risks of recurrent seizure discussed. Seizure precautions discussed. Pt aware that unlawful to drive in [place] until seizure free at least 6 months.   Do not swim in pool alone  Do not bathe in tub full of water; shower preferred  Avoid medications such as Tramadol, Wellbutrin, Cipro, Levaquin  Avoid ladders/heights, binge drinking, and sleep deprivation    Seizure medications can be associated with certain side effects, including memory dysfunction or mood disorders.   Excessive daytime sleepiness may occur sometimes leading to car crashes.  Abrupt stoppage of anticonvulsant medications can be medically troublesome.      Orders Placed This Encounter    levETIRAcetam (KEPPRA) 750 MG Tab       Follow up in about 1 year (around  3/21/2026) for Virtual Visit, seizures. In addition to their scheduled follow up, the patient has also been instructed to follow up on as needed basis.     Questions and concerns were sought and answered to the patient's stated verbal satisfaction.    The patient verbalizes understanding and agreement with the above stated treatment plan.   Items discussed include acute and/or chronic neurological, sleep, or other issues and their attendant differential diagnoses.  Potential for additional testing, treatment options, and prognosis also discussed.  Dr. Jono Ramos available during encounter.    Stephanie Rao, MSN, APRN, FNP-C  [facility]  [phone]